# Patient Record
Sex: MALE | Race: WHITE | Employment: OTHER | URBAN - METROPOLITAN AREA
[De-identification: names, ages, dates, MRNs, and addresses within clinical notes are randomized per-mention and may not be internally consistent; named-entity substitution may affect disease eponyms.]

---

## 2017-04-19 ENCOUNTER — HOSPITAL ENCOUNTER (EMERGENCY)
Age: 82
Discharge: HOME | End: 2017-04-19
Attending: EMERGENCY MEDICINE | Admitting: EMERGENCY MEDICINE
Payer: MEDICARE

## 2017-04-19 ENCOUNTER — APPOINTMENT (OUTPATIENT)
Dept: GENERAL RADIOLOGY | Age: 82
End: 2017-04-19
Attending: EMERGENCY MEDICINE
Payer: MEDICARE

## 2017-04-19 VITALS
RESPIRATION RATE: 25 BRPM | DIASTOLIC BLOOD PRESSURE: 69 MMHG | OXYGEN SATURATION: 94 % | SYSTOLIC BLOOD PRESSURE: 184 MMHG | HEIGHT: 68 IN | BODY MASS INDEX: 22.82 KG/M2 | TEMPERATURE: 97.5 F | WEIGHT: 150.57 LBS | HEART RATE: 58 BPM

## 2017-04-19 DIAGNOSIS — R07.89 CHEST WALL PAIN: Primary | ICD-10-CM

## 2017-04-19 PROCEDURE — 71020 XR CHEST PA LAT: CPT

## 2017-04-19 PROCEDURE — 99283 EMERGENCY DEPT VISIT LOW MDM: CPT

## 2017-04-19 PROCEDURE — 74011250637 HC RX REV CODE- 250/637: Performed by: EMERGENCY MEDICINE

## 2017-04-19 RX ADMIN — BACITRACIN, NEOMYCIN, POLYMYXIN B 1 PACKET: 400; 3.5; 5 OINTMENT TOPICAL at 09:58

## 2017-04-19 NOTE — ED PROVIDER NOTES
Patient is a 80 y.o. male presenting with fall and rib injury. The history is provided by the patient. Fall   The accident occurred yesterday. The fall occurred while walking (he as cutting grass and fell). He fell from a height of ground level. He landed on grass. Point of impact: not sure. Pain location: left chest wall. The pain is at a severity of 6/10. He was ambulatory at the scene. There was no entrapment after the fall. Pertinent negatives include no fever, no abdominal pain, no nausea, no vomiting and no loss of consciousness. He has tried nothing for the symptoms. The patient's last tetanus shot was less than 5 years ago. Rib Injury   This is a new problem. The current episode started yesterday. Associated symptoms include chest pain. Pertinent negatives include no fever, no sputum production, no wheezing, no vomiting and no abdominal pain. He has tried nothing for the symptoms. pain worse with deep breaths on the left side    Past Medical History:   Diagnosis Date    Activity involving cardiorespiratory exercise 4-14-11    rides exercise bike 2-3mi.  daily, walks 1 1/2 mi. daily    CAD (coronary artery disease)     mild, controlled     Chronic kidney disease     hx kidney stones    GERD (gastroesophageal reflux disease)     Hypertension     Unspecified adverse effect of anesthesia     elevated BP       Past Surgical History:   Procedure Laterality Date    ABDOMEN SURGERY PROC UNLISTED  1990's    exp lap    CARDIAC SURG PROCEDURE UNLIST  1990    cardiac cath    HX LITHOTRIPSY      HX ORTHOPAEDIC  1980's    left knee surgery    HX ORTHOPAEDIC      deisi trigger finger releases    HX PROSTATECTOMY  2005    TURP         Family History:   Problem Relation Age of Onset    Heart Disease Father     Heart Disease Brother        Social History     Social History    Marital status:      Spouse name: N/A    Number of children: N/A    Years of education: N/A     Occupational History    Not on file. Social History Main Topics    Smoking status: Former Smoker    Smokeless tobacco: Not on file    Alcohol use No    Drug use: Not on file    Sexual activity: Not on file     Other Topics Concern    Not on file     Social History Narrative         ALLERGIES: Nitroglycerin; Bee sting [sting, bee]; Diclofenac; and Meloxicam    Review of Systems   Constitutional: Negative for chills and fever. Respiratory: Negative for sputum production, chest tightness, shortness of breath and wheezing. Cardiovascular: Positive for chest pain. Gastrointestinal: Negative for abdominal pain, nausea and vomiting. Skin: Positive for wound. One on left hand and one on left forearm   Neurological: Negative for loss of consciousness. All other systems reviewed and are negative. Vitals:    04/19/17 0910   BP: (!) 221/103   Pulse: (!) 58   Resp: 25   Temp: 97.5 °F (36.4 °C)   SpO2: 99%   Weight: 68.3 kg (150 lb 9.2 oz)   Height: 5' 8\" (1.727 m)            Physical Exam   Constitutional: He is oriented to person, place, and time. He appears well-developed and well-nourished. No distress. HENT:   Head: Normocephalic and atraumatic. Eyes: Conjunctivae and EOM are normal.   Neck: Normal range of motion. Cardiovascular: Regular rhythm, normal heart sounds and intact distal pulses. Bradycardia present. No murmur heard. Pulmonary/Chest: Effort normal and breath sounds normal. No stridor. Tachypnea noted. No respiratory distress. He exhibits tenderness. Shallow breathing noted   Abdominal: Soft. Bowel sounds are normal. There is no tenderness. Musculoskeletal: Normal range of motion. He exhibits no edema or tenderness. Neurological: He is alert and oriented to person, place, and time. No cranial nerve deficit. Skin: Skin is warm and dry. He is not diaphoretic. Superficial skin tears on left hand and left forearm, no active bleeding noted   Psychiatric: He has a normal mood and affect. Nursing note and vitals reviewed. MDM  Number of Diagnoses or Management Options  Diagnosis management comments: Left chest wall injury - possible contusion vs strain vs rib fracture - check CXR - patient decline pain medication until after the x-ray. 0945 - x-ray neg will d/c home       Amount and/or Complexity of Data Reviewed  Tests in the radiology section of CPT®: reviewed and ordered    Patient Progress  Patient progress: improved    ED Course       Procedures      Final result (Exam End: 4/19/2017  9:20 AM) Open        Study Result      Indication: Evaluate for left chest pain.      Exam: PA and lateral views of the chest.     There is no prior study for direct comparison.     Findings: Cardiomediastinal silhouette is within normal limits. Lungs are clear  bilaterally.  Pleural spaces are normal. Osseous structures are intact.     IMPRESSION  IMPRESSION: No acute cardiopulmonary disease

## 2017-04-19 NOTE — ED NOTES
The patient was discharged home by Dr Zoila Vee  in stable condition. The patient is alert and oriented, in no respiratory distress and discharge vital signs obtained. The patient's diagnosis, condition and treatment were explained. The patient expressed understanding. No prescriptions given. No work/school note given. A discharge plan has been developed. A  was not involved in the process. Aftercare instructions were given. Pt ambulatory out of the ED with family.

## 2017-04-19 NOTE — ED TRIAGE NOTES
Pt presents ambulatory to the treatment room in no distress. Pt rpts several weeks ago he fell several times while trimming his trees. Yesterday while cutting his grass using a self-propelled  and fell to the ground. + skin tear to left forearm and left hand. Denies LOC. PCP on vacation this week. Pt rpts left side rib pain and left chest pain when taking a deep breath.

## 2017-04-19 NOTE — DISCHARGE INSTRUCTIONS
Musculoskeletal Chest Pain: Care Instructions  Your Care Instructions  Chest pain is not always a sign that something is wrong with your heart or that you have another serious problem. The doctor thinks your chest pain is caused by strained muscles or ligaments, inflamed chest cartilage, or another problem in your chest, rather than by your heart. You may need more tests to find the cause of your chest pain. Follow-up care is a key part of your treatment and safety. Be sure to make and go to all appointments, and call your doctor if you are having problems. Its also a good idea to know your test results and keep a list of the medicines you take. How can you care for yourself at home? · Take pain medicines exactly as directed. ¨ If the doctor gave you a prescription medicine for pain, take it as prescribed. ¨ If you are not taking a prescription pain medicine, ask your doctor if you can take an over-the-counter medicine. · Rest and protect the sore area. · Stop, change, or take a break from any activity that may be causing your pain or soreness. · Put ice or a cold pack on the sore area for 10 to 20 minutes at a time. Try to do this every 1 to 2 hours for the next 3 days (when you are awake) or until the swelling goes down. Put a thin cloth between the ice and your skin. · After 2 or 3 days, apply a heating pad set on low or a warm cloth to the area that hurts. Some doctors suggest that you go back and forth between hot and cold. · Do not wrap or tape your ribs for support. This may cause you to take smaller breaths, which could increase your risk of lung problems. · Mentholated creams such as Bengay or Icy Hot may soothe sore muscles. Follow the instructions on the package. · Follow your doctor's instructions for exercising. · Gentle stretching and massage may help you get better faster. Stretch slowly to the point just before pain begins, and hold the stretch for at least 15 to 30 seconds.  Do this 3 or 4 times a day. Stretch just after you have applied heat. · As your pain gets better, slowly return to your normal activities. Any increased pain may be a sign that you need to rest a while longer. When should you call for help? Call 911 anytime you think you may need emergency care. For example, call if:  · You have chest pain or pressure. This may occur with:  ¨ Sweating. ¨ Shortness of breath. ¨ Nausea or vomiting. ¨ Pain that spreads from the chest to the neck, jaw, or one or both shoulders or arms. ¨ Dizziness or lightheadedness. ¨ A fast or uneven pulse. After calling 911, chew 1 adult-strength aspirin. Wait for an ambulance. Do not try to drive yourself. · You have sudden chest pain and shortness of breath, or you cough up blood. Call your doctor now or seek immediate medical care if:  · You have any trouble breathing. · Your chest pain gets worse. · Your chest pain occurs consistently with exercise and is relieved by rest.  Watch closely for changes in your health, and be sure to contact your doctor if:  · Your chest pain does not get better after 1 week. Where can you learn more? Go to http://zack-tyson.info/. Enter V293 in the search box to learn more about \"Musculoskeletal Chest Pain: Care Instructions. \"  Current as of: May 27, 2016  Content Version: 11.2  © 3415-9596 [employer]. Care instructions adapted under license by [employer] (which disclaims liability or warranty for this information). If you have questions about a medical condition or this instruction, always ask your healthcare professional. [place] [address] any warranty or liability for your use of this information. We hope that we have addressed all of your medical concerns. The examination and treatment you received in the Emergency Department were for an emergent problem and were not intended as complete care.  It is important that you follow up with your healthcare provider(s) for ongoing care. If your symptoms worsen or do not improve as expected, and you are unable to reach your usual health care provider(s), you should return to the Emergency Department. Today's healthcare is undergoing tremendous change, and patient satisfaction surveys are one of the many tools to assess the quality of medical care. You may receive a survey from the [employer] regarding your experience in the Emergency Department. I hope that your experience has been completely positive, particularly the medical care that I provided. As such, please participate in the survey; anything less than excellent does not meet my expectations or intentions. [address] [facility] and [facility] participate in nationally recognized quality of care measures. If your blood pressure is greater than 120/80, as reported below, we urge that you seek medical care to address the potential of high blood pressure, commonly known as hypertension. Hypertension can be hereditary or can be caused by certain medical conditions, pain, stress, or \"white coat syndrome. \"       Please make an appointment with your health care provider(s) for follow up of your Emergency Department visit. VITALS:   Patient Vitals for the past 8 hrs:   Temp Pulse Resp BP SpO2   04/19/17 0910 97.5 °F (36.4 °C) (!) 58 25 (!) 221/103 99 %          Thank you for allowing us to provide you with medical care today. We realize that you have many choices for your emergency care needs. Please choose us in the future for any continued health care needs. Robert Moreno, [address]: [phone]            No results found for this or any previous visit (from the past 24 hour(s)). Xr Chest Pa Lat    Result Date: 4/19/2017  Indication:  Evaluate for left chest pain.  Exam: PA and lateral views of the chest. There is no prior study for direct comparison. Findings: Cardiomediastinal silhouette is within normal limits. Lungs are clear bilaterally. Pleural spaces are normal. Osseous structures are intact. IMPRESSION: No acute cardiopulmonary disease.